# Patient Record
Sex: FEMALE | Race: WHITE | NOT HISPANIC OR LATINO | URBAN - METROPOLITAN AREA
[De-identification: names, ages, dates, MRNs, and addresses within clinical notes are randomized per-mention and may not be internally consistent; named-entity substitution may affect disease eponyms.]

---

## 2024-04-25 ENCOUNTER — APPOINTMENT (RX ONLY)
Dept: URBAN - METROPOLITAN AREA CLINIC 183 | Facility: CLINIC | Age: 29
Setting detail: DERMATOLOGY
End: 2024-04-25

## 2024-04-25 VITALS — WEIGHT: 145 LBS | HEIGHT: 60 IN

## 2024-04-25 DIAGNOSIS — B07.8 OTHER VIRAL WARTS: ICD-10-CM

## 2024-04-25 DIAGNOSIS — L28.1 PRURIGO NODULARIS: ICD-10-CM | Status: INADEQUATELY CONTROLLED

## 2024-04-25 DIAGNOSIS — L65.0 TELOGEN EFFLUVIUM: ICD-10-CM | Status: INADEQUATELY CONTROLLED

## 2024-04-25 PROCEDURE — 17110 DESTRUCTION B9 LES UP TO 14: CPT

## 2024-04-25 PROCEDURE — ? COUNSELING

## 2024-04-25 PROCEDURE — 99204 OFFICE O/P NEW MOD 45 MIN: CPT | Mod: 25

## 2024-04-25 PROCEDURE — ? PRESCRIPTION

## 2024-04-25 PROCEDURE — ? TREATMENT REGIMEN

## 2024-04-25 PROCEDURE — ? LIQUID NITROGEN

## 2024-04-25 RX ORDER — SPIRONOLACTONE 100 MG/1
1 TABLET, FILM COATED ORAL QHS
Qty: 30 | Refills: 1 | Status: ERX | COMMUNITY
Start: 2024-04-25

## 2024-04-25 RX ORDER — CLOBETASOL PROPIONATE 0.5 MG/ML
APPLY SOLUTION TOPICAL QHS
Qty: 50 | Refills: 1 | Status: ERX | COMMUNITY
Start: 2024-04-25

## 2024-04-25 RX ADMIN — CLOBETASOL PROPIONATE APPLY: 0.5 SOLUTION TOPICAL at 00:00

## 2024-04-25 RX ADMIN — SPIRONOLACTONE 1: 100 TABLET, FILM COATED ORAL at 00:00

## 2024-04-25 ASSESSMENT — LOCATION ZONE DERM
LOCATION ZONE: HAND
LOCATION ZONE: FINGER
LOCATION ZONE: SCALP
LOCATION ZONE: FACE

## 2024-04-25 ASSESSMENT — LOCATION SIMPLE DESCRIPTION DERM
LOCATION SIMPLE: RIGHT MIDDLE FINGER
LOCATION SIMPLE: LEFT FOREHEAD
LOCATION SIMPLE: LEFT OCCIPITAL SCALP
LOCATION SIMPLE: POSTERIOR SCALP
LOCATION SIMPLE: RIGHT HAND
LOCATION SIMPLE: ANTERIOR SCALP

## 2024-04-25 ASSESSMENT — LOCATION DETAILED DESCRIPTION DERM
LOCATION DETAILED: MID-FRONTAL SCALP
LOCATION DETAILED: RIGHT DISTAL PALMAR MIDDLE FINGER
LOCATION DETAILED: POSTERIOR MID-PARIETAL SCALP
LOCATION DETAILED: LEFT SUPERIOR FOREHEAD
LOCATION DETAILED: LEFT SUPERIOR OCCIPITAL SCALP
LOCATION DETAILED: RIGHT ULNAR PALM

## 2024-04-25 NOTE — PROCEDURE: TREATMENT REGIMEN
Detail Level: Zone
Initiate Treatment: clobetasol 0.05 % scalp solution QHS
Initiate Treatment: spironolactone 100 mg tablet Qhs

## 2024-04-25 NOTE — PROCEDURE: LIQUID NITROGEN
Detail Level: Detailed
Post-Care Instructions: I reviewed with the patient in detail post-care instructions. Patient is to wear sunprotection, and avoid picking at any of the treated lesions. Pt may apply Vaseline to crusted or scabbing areas.
Show Topical Anesthesia Variable?: Yes
Spray Paint Technique: No
Medical Necessity Information: It is in your best interest to select a reason for this procedure from the list below. All of these items fulfill various CMS LCD requirements except the new and changing color options.
Consent: The patient's consent was obtained including but not limited to risks of crusting, scabbing, blistering, scarring, darker or lighter pigmentary change, recurrence, incomplete removal and infection.
Spray Paint Text: The liquid nitrogen was applied to the skin utilizing a spray paint frosting technique.
Medical Necessity Clause: This procedure was medically necessary because the lesions that were treated were:

## 2024-04-25 NOTE — HPI: RASH (SEBORRHEIC DERMATITIS)
Is This A New Presentation, Or A Follow-Up?: Rash
How Severe Is It?: mild
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 Pt says they treated lice and report that their scalp had been really irritated and inflamed since treatment. Pt says that scalp has been really itchy and that there are spots that are scaly.

## 2024-06-20 ENCOUNTER — APPOINTMENT (RX ONLY)
Dept: URBAN - METROPOLITAN AREA CLINIC 183 | Facility: CLINIC | Age: 29
Setting detail: DERMATOLOGY
End: 2024-06-20

## 2024-06-20 VITALS — WEIGHT: 140 LBS | HEIGHT: 60 IN

## 2024-06-20 DIAGNOSIS — L28.1 PRURIGO NODULARIS: ICD-10-CM

## 2024-06-20 DIAGNOSIS — L28.1 PRURIGO NODULARIS: ICD-10-CM | Status: INADEQUATELY CONTROLLED

## 2024-06-20 DIAGNOSIS — B07.8 OTHER VIRAL WARTS: ICD-10-CM

## 2024-06-20 DIAGNOSIS — L65.0 TELOGEN EFFLUVIUM: ICD-10-CM | Status: IMPROVED

## 2024-06-20 PROCEDURE — 17110 DESTRUCTION B9 LES UP TO 14: CPT

## 2024-06-20 PROCEDURE — ? TREATMENT REGIMEN

## 2024-06-20 PROCEDURE — ? PRESCRIPTION

## 2024-06-20 PROCEDURE — ? COUNSELING

## 2024-06-20 PROCEDURE — ? LIQUID NITROGEN

## 2024-06-20 PROCEDURE — 99214 OFFICE O/P EST MOD 30 MIN: CPT | Mod: 25

## 2024-06-20 RX ORDER — SPIRONOLACTONE 100 MG/1
1 TABLET, FILM COATED ORAL QHS
Qty: 30 | Refills: 1 | Status: ERX

## 2024-06-20 RX ORDER — FEXOFENADINE HYDROCHLORIDE 180 MG/1
1 TABLET ORAL QHS
Qty: 30 | Refills: 1 | Status: ERX | COMMUNITY
Start: 2024-06-20

## 2024-06-20 RX ORDER — CLOBETASOL PROPIONATE 0.5 MG/ML
APPLY SOLUTION TOPICAL QHS
Qty: 50 | Refills: 2 | Status: ERX

## 2024-06-20 RX ORDER — CETIRIZINE HCL 10 MG
1 CAPSULE ORAL QAM
Qty: 30 | Refills: 1 | Status: ERX | COMMUNITY
Start: 2024-06-20

## 2024-06-20 RX ADMIN — FEXOFENADINE HYDROCHLORIDE 1: 180 TABLET ORAL at 00:00

## 2024-06-20 RX ADMIN — Medication 1: at 00:00

## 2024-06-20 ASSESSMENT — LOCATION DETAILED DESCRIPTION DERM
LOCATION DETAILED: LEFT SUPERIOR FOREHEAD
LOCATION DETAILED: MID-OCCIPITAL SCALP
LOCATION DETAILED: LEFT CENTRAL FRONTAL SCALP
LOCATION DETAILED: RIGHT SUPERIOR PARIETAL SCALP
LOCATION DETAILED: RIGHT ULNAR PALM
LOCATION DETAILED: LEFT CENTRAL PARIETAL SCALP
LOCATION DETAILED: RIGHT CENTRAL PARIETAL SCALP
LOCATION DETAILED: LEFT SUPERIOR OCCIPITAL SCALP
LOCATION DETAILED: POSTERIOR MID-PARIETAL SCALP
LOCATION DETAILED: MID-FRONTAL SCALP

## 2024-06-20 ASSESSMENT — LOCATION SIMPLE DESCRIPTION DERM
LOCATION SIMPLE: SCALP
LOCATION SIMPLE: RIGHT HAND
LOCATION SIMPLE: LEFT FOREHEAD
LOCATION SIMPLE: ANTERIOR SCALP
LOCATION SIMPLE: LEFT SCALP
LOCATION SIMPLE: LEFT OCCIPITAL SCALP
LOCATION SIMPLE: POSTERIOR SCALP

## 2024-06-20 ASSESSMENT — LOCATION ZONE DERM
LOCATION ZONE: FACE
LOCATION ZONE: SCALP
LOCATION ZONE: HAND

## 2024-06-20 NOTE — PROCEDURE: TREATMENT REGIMEN
Detail Level: Zone
Plan: Discussed N-Acetylcysteine. Pt will get OTC and take 2 tablets daily. She does have some itching but admits to picking out of habit 
Continue Regimen: clobetasol 0.05 % scalp solution QHS
Initiate Treatment: spironolactone 100 mg tablet Qhs
Initiate Treatment: Cetrizine 10mg QAM \\nFexofenadine 180mg QHS

## 2024-06-20 NOTE — PROCEDURE: LIQUID NITROGEN
Include Z78.9 (Other Specified Conditions Influencing Health Status) As An Associated Diagnosis?: No
Consent: The patient's consent was obtained including but not limited to risks of crusting, scabbing, blistering, scarring, darker or lighter pigmentary change, recurrence, incomplete removal and infection.
Show Topical Anesthesia Variable?: Yes
Detail Level: Detailed
Medical Necessity Clause: This procedure was medically necessary because the lesions that were treated were:
Medical Necessity Information: It is in your best interest to select a reason for this procedure from the list below. All of these items fulfill various CMS LCD requirements except the new and changing color options.
Post-Care Instructions: I reviewed with the patient in detail post-care instructions. Patient is to wear sunprotection, and avoid picking at any of the treated lesions. Pt may apply Vaseline to crusted or scabbing areas.
Spray Paint Text: The liquid nitrogen was applied to the skin utilizing a spray paint frosting technique.

## 2024-07-26 ENCOUNTER — APPOINTMENT (RX ONLY)
Dept: URBAN - METROPOLITAN AREA CLINIC 183 | Facility: CLINIC | Age: 29
Setting detail: DERMATOLOGY
End: 2024-07-26

## 2024-07-26 VITALS — HEIGHT: 60 IN | WEIGHT: 170 LBS

## 2024-07-26 DIAGNOSIS — L28.1 PRURIGO NODULARIS: ICD-10-CM | Status: IMPROVED

## 2024-07-26 DIAGNOSIS — L65.0 TELOGEN EFFLUVIUM: ICD-10-CM | Status: IMPROVED

## 2024-07-26 PROCEDURE — ? COUNSELING

## 2024-07-26 PROCEDURE — ? PRESCRIPTION

## 2024-07-26 PROCEDURE — ? TREATMENT REGIMEN

## 2024-07-26 PROCEDURE — 99214 OFFICE O/P EST MOD 30 MIN: CPT

## 2024-07-26 RX ORDER — CETIRIZINE HCL 10 MG
1 CAPSULE ORAL QAM
Qty: 30 | Refills: 5 | Status: ERX

## 2024-07-26 RX ORDER — FEXOFENADINE HYDROCHLORIDE 180 MG/1
1 TABLET ORAL QHS
Qty: 30 | Refills: 5 | Status: ERX

## 2024-07-26 RX ORDER — CLOBETASOL PROPIONATE 0.5 MG/ML
APPLY SOLUTION TOPICAL QHS
Qty: 50 | Refills: 5 | Status: ERX

## 2024-07-26 RX ORDER — SPIRONOLACTONE 100 MG/1
1 TABLET, FILM COATED ORAL QHS
Qty: 30 | Refills: 5 | Status: ERX

## 2024-07-26 ASSESSMENT — LOCATION DETAILED DESCRIPTION DERM
LOCATION DETAILED: POSTERIOR MID-PARIETAL SCALP
LOCATION DETAILED: LEFT CENTRAL PARIETAL SCALP
LOCATION DETAILED: MID-OCCIPITAL SCALP
LOCATION DETAILED: LEFT CENTRAL FRONTAL SCALP
LOCATION DETAILED: LEFT SUPERIOR OCCIPITAL SCALP
LOCATION DETAILED: RIGHT SUPERIOR PARIETAL SCALP
LOCATION DETAILED: LEFT SUPERIOR FOREHEAD
LOCATION DETAILED: RIGHT CENTRAL PARIETAL SCALP
LOCATION DETAILED: MID-FRONTAL SCALP

## 2024-07-26 ASSESSMENT — LOCATION SIMPLE DESCRIPTION DERM
LOCATION SIMPLE: ANTERIOR SCALP
LOCATION SIMPLE: SCALP
LOCATION SIMPLE: LEFT FOREHEAD
LOCATION SIMPLE: LEFT SCALP
LOCATION SIMPLE: POSTERIOR SCALP
LOCATION SIMPLE: LEFT OCCIPITAL SCALP

## 2024-07-26 ASSESSMENT — LOCATION ZONE DERM
LOCATION ZONE: FACE
LOCATION ZONE: SCALP

## 2024-07-26 NOTE — PROCEDURE: TREATMENT REGIMEN
Detail Level: Zone
Plan: Pt reports that N-Acetylcysteine has helped with picking. Scalp is clear today. Pt is thrilled with improvement. The N-Acyetylcysteine seems to have caused upset stomach and hot flashes at night when pt increased to BID. She will decrease back to once daily and see if she tolerates it better with the same improvement 
Continue Regimen: clobetasol 0.05 % scalp solution QHS \\nN-Acetylcysteine once daily
Continue Regimen: spironolactone 100 mg tablet Qhs
Continue Regimen: Cetrizine 10mg QAM \\nFexofenadine 180mg QHS